# Patient Record
Sex: FEMALE | Race: WHITE
[De-identification: names, ages, dates, MRNs, and addresses within clinical notes are randomized per-mention and may not be internally consistent; named-entity substitution may affect disease eponyms.]

---

## 2018-07-02 ENCOUNTER — HOSPITAL ENCOUNTER (EMERGENCY)
Dept: HOSPITAL 26 - MED | Age: 83
Discharge: HOME | End: 2018-07-02
Payer: MEDICAID

## 2018-07-02 VITALS — SYSTOLIC BLOOD PRESSURE: 121 MMHG | DIASTOLIC BLOOD PRESSURE: 63 MMHG

## 2018-07-02 VITALS — HEIGHT: 57 IN | BODY MASS INDEX: 22.01 KG/M2 | WEIGHT: 102 LBS

## 2018-07-02 VITALS — DIASTOLIC BLOOD PRESSURE: 58 MMHG | SYSTOLIC BLOOD PRESSURE: 123 MMHG

## 2018-07-02 DIAGNOSIS — K56.41: ICD-10-CM

## 2018-07-02 DIAGNOSIS — F99: ICD-10-CM

## 2018-07-02 DIAGNOSIS — R19.7: ICD-10-CM

## 2018-07-02 DIAGNOSIS — E86.0: Primary | ICD-10-CM

## 2018-07-02 DIAGNOSIS — K59.8: ICD-10-CM

## 2018-07-02 LAB
ALBUMIN FLD-MCNC: 3.8 G/DL (ref 3.4–5)
AMYLASE SERPL-CCNC: 62 U/L (ref 25–115)
ANION GAP SERPL CALCULATED.3IONS-SCNC: 11 MMOL/L (ref 8–16)
APPEARANCE UR: CLEAR
AST SERPL-CCNC: 20 U/L (ref 15–37)
BASOPHILS # BLD AUTO: 0.1 K/UL (ref 0–0.22)
BASOPHILS NFR BLD AUTO: 1.8 % (ref 0–2)
BILIRUB SERPL-MCNC: 0.9 MG/DL (ref 0–1)
BILIRUB UR QL STRIP: (no result)
BUN SERPL-MCNC: 22 MG/DL (ref 7–18)
CHLORIDE SERPL-SCNC: 104 MMOL/L (ref 98–107)
CO2 SERPL-SCNC: 28.3 MMOL/L (ref 21–32)
COLOR UR: YELLOW
CREAT SERPL-MCNC: 0.9 MG/DL (ref 0.6–1.3)
EOSINOPHIL # BLD AUTO: 0.1 K/UL (ref 0–0.4)
EOSINOPHIL NFR BLD AUTO: 3.8 % (ref 0–4)
ERYTHROCYTE [DISTWIDTH] IN BLOOD BY AUTOMATED COUNT: 15.5 % (ref 11.6–13.7)
GFR SERPL CREATININE-BSD FRML MDRD: (no result) ML/MIN (ref 90–?)
GLUCOSE SERPL-MCNC: 92 MG/DL (ref 74–106)
GLUCOSE UR STRIP-MCNC: NEGATIVE MG/DL
HCT VFR BLD AUTO: 35.2 % (ref 36–48)
HGB BLD-MCNC: 11.7 G/DL (ref 12–16)
HGB UR QL STRIP: (no result)
LEUKOCYTE ESTERASE UR QL STRIP: NEGATIVE
LIPASE SERPL-CCNC: 62 U/L (ref 73–393)
LYMPHOCYTES # BLD AUTO: 1.5 K/UL (ref 2.5–16.5)
LYMPHOCYTES NFR BLD AUTO: 43 % (ref 20.5–51.1)
MCH RBC QN AUTO: 30 PG (ref 27–31)
MCHC RBC AUTO-ENTMCNC: 33 G/DL (ref 33–37)
MCV RBC AUTO: 88.8 FL (ref 80–94)
MONOCYTES # BLD AUTO: 0.2 K/UL (ref 0.8–1)
MONOCYTES NFR BLD AUTO: 5.8 % (ref 1.7–9.3)
NEUTROPHILS # BLD AUTO: 1.6 K/UL (ref 1.8–7.7)
NEUTROPHILS NFR BLD AUTO: 45.6 % (ref 42.2–75.2)
NITRITE UR QL STRIP: NEGATIVE
PH UR STRIP: 5.5 [PH] (ref 5–9)
PLATELET # BLD AUTO: 168 K/UL (ref 140–450)
POTASSIUM SERPL-SCNC: 4.3 MMOL/L (ref 3.5–5.1)
RBC # BLD AUTO: 3.96 MIL/UL (ref 4.2–5.4)
RBC #/AREA URNS HPF: (no result) /HPF (ref 0–5)
SODIUM SERPL-SCNC: 139 MMOL/L (ref 136–145)
WBC # BLD AUTO: 3.5 K/UL (ref 4.8–10.8)
WBC,URINE: (no result) /HPF (ref 0–5)

## 2018-07-02 PROCEDURE — 99285 EMERGENCY DEPT VISIT HI MDM: CPT

## 2018-07-02 PROCEDURE — 81001 URINALYSIS AUTO W/SCOPE: CPT

## 2018-07-02 PROCEDURE — 85025 COMPLETE CBC W/AUTO DIFF WBC: CPT

## 2018-07-02 PROCEDURE — 74176 CT ABD & PELVIS W/O CONTRAST: CPT

## 2018-07-02 PROCEDURE — 36415 COLL VENOUS BLD VENIPUNCTURE: CPT

## 2018-07-02 PROCEDURE — 76705 ECHO EXAM OF ABDOMEN: CPT

## 2018-07-02 PROCEDURE — 96361 HYDRATE IV INFUSION ADD-ON: CPT

## 2018-07-02 PROCEDURE — 96374 THER/PROPH/DIAG INJ IV PUSH: CPT

## 2018-07-02 PROCEDURE — 80053 COMPREHEN METABOLIC PANEL: CPT

## 2018-07-02 PROCEDURE — 82150 ASSAY OF AMYLASE: CPT

## 2018-07-02 PROCEDURE — 83690 ASSAY OF LIPASE: CPT

## 2018-07-02 NOTE — NUR
pt resting comfortably in [facility] at this time. Son and DTR at bedside 
at this time. safety precautions in place. vss. will continue to monitor.

## 2018-07-02 NOTE — NUR
81 yo f bib son with c/o intermittent diarrhea x 3 weeks. Referred by pcp, 
Leonid GR, today for blood work. Patient birdgette any abdomen pain or fevers. pt 
aaox4. gcs 15. cms intact. rr even and unlabored. lungs clear. abd soft, 
non-tender. er md virgen notified. pt needs met. safety prevautions in place. 
will continue to monitor.

## 2018-07-02 NOTE — NUR
pt resting in hospital [facility] at this time. vss. rr even and unlabored. family 
at bedside. safety precautions in place. will continue to monitor.

## 2018-07-02 NOTE — NUR
pt resting in [facility] at this time JUAN Ann at bedside attempting to 
insert IV. Safety precautions in place. Pt to receive IV meds at this time.

## 2018-07-02 NOTE — NUR
Patient discharged with v/s stable. Written and verbal after care instructions 
given and explained. 

Patient alert, oriented and verbalized understanding of instructions. Wheel 
Chair Assisted with by caregiver. All questions addressed prior to discharge. 
ID band removed. Patient advised to follow up with PMD. Rx of Lactulose given. 
Patient educated on indication of medication including possible reaction and 
side effects. Opportunity to ask questions provided and answered.

## 2018-09-26 ENCOUNTER — HOSPITAL ENCOUNTER (EMERGENCY)
Dept: HOSPITAL 26 - MED | Age: 83
Discharge: HOME | End: 2018-09-26
Payer: MEDICAID

## 2018-09-26 VITALS — BODY MASS INDEX: 22.01 KG/M2 | WEIGHT: 102 LBS | HEIGHT: 57 IN

## 2018-09-26 VITALS — SYSTOLIC BLOOD PRESSURE: 100 MMHG | DIASTOLIC BLOOD PRESSURE: 55 MMHG

## 2018-09-26 VITALS — SYSTOLIC BLOOD PRESSURE: 91 MMHG | DIASTOLIC BLOOD PRESSURE: 54 MMHG

## 2018-09-26 DIAGNOSIS — I48.91: ICD-10-CM
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
## 2018-09-26 DIAGNOSIS — S92.001A: Primary | ICD-10-CM

## 2018-09-26 DIAGNOSIS — Z86.73: ICD-10-CM
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
## 2018-09-26 LAB
ALBUMIN FLD-MCNC: 3.6 G/DL (ref 3.4–5)
ANION GAP SERPL CALCULATED.3IONS-SCNC: 9.5 MMOL/L (ref 8–16)
AST SERPL-CCNC: 14 U/L (ref 15–37)
BASOPHILS # BLD AUTO: 0 K/UL (ref 0–0.22)
BASOPHILS NFR BLD AUTO: 0.5 % (ref 0–2)
BILIRUB SERPL-MCNC: 0.6 MG/DL (ref 0–1)
BUN SERPL-MCNC: 18 MG/DL (ref 7–18)
CHLORIDE SERPL-SCNC: 105 MMOL/L (ref 98–107)
CO2 SERPL-SCNC: 28.7 MMOL/L (ref 21–32)
CREAT SERPL-MCNC: 0.8 MG/DL (ref 0.6–1.3)
EOSINOPHIL # BLD AUTO: 0 K/UL (ref 0–0.4)
EOSINOPHIL NFR BLD AUTO: 0.5 % (ref 0–4)
ERYTHROCYTE [DISTWIDTH] IN BLOOD BY AUTOMATED COUNT: 15.4 % (ref 11.6–13.7)
GFR SERPL CREATININE-BSD FRML MDRD: (no result) ML/MIN (ref 90–?)
GLUCOSE SERPL-MCNC: 87 MG/DL (ref 74–106)
HCT VFR BLD AUTO: 34.4 % (ref 36–48)
HGB BLD-MCNC: 11.2 G/DL (ref 12–16)
LYMPHOCYTES # BLD AUTO: 0.9 K/UL (ref 2.5–16.5)
LYMPHOCYTES NFR BLD AUTO: 17.5 % (ref 20.5–51.1)
MCH RBC QN AUTO: 30 PG (ref 27–31)
MCHC RBC AUTO-ENTMCNC: 33 G/DL (ref 33–37)
MCV RBC AUTO: 91.7 FL (ref 80–94)
MONOCYTES # BLD AUTO: 0.3 K/UL (ref 0.8–1)
MONOCYTES NFR BLD AUTO: 5.7 % (ref 1.7–9.3)
NEUTROPHILS # BLD AUTO: 3.8 K/UL (ref 1.8–7.7)
NEUTROPHILS NFR BLD AUTO: 75.8 % (ref 42.2–75.2)
PLATELET # BLD AUTO: 216 K/UL (ref 140–450)
POTASSIUM SERPL-SCNC: 4.2 MMOL/L (ref 3.5–5.1)
PROTHROMBIN TIME: 11.1 SECS (ref 10.8–13.4)
RBC # BLD AUTO: 3.75 MIL/UL (ref 4.2–5.4)
SODIUM SERPL-SCNC: 139 MMOL/L (ref 136–145)
WBC # BLD AUTO: 5 K/UL (ref 4.8–10.8)

## 2018-09-26 PROCEDURE — 84484 ASSAY OF TROPONIN QUANT: CPT

## 2018-09-26 PROCEDURE — 99285 EMERGENCY DEPT VISIT HI MDM: CPT

## 2018-09-26 PROCEDURE — 93005 ELECTROCARDIOGRAM TRACING: CPT

## 2018-09-26 PROCEDURE — 80053 COMPREHEN METABOLIC PANEL: CPT

## 2018-09-26 PROCEDURE — 70450 CT HEAD/BRAIN W/O DYE: CPT

## 2018-09-26 PROCEDURE — 85730 THROMBOPLASTIN TIME PARTIAL: CPT

## 2018-09-26 PROCEDURE — 85025 COMPLETE CBC W/AUTO DIFF WBC: CPT

## 2018-09-26 PROCEDURE — 73030 X-RAY EXAM OF SHOULDER: CPT

## 2018-09-26 PROCEDURE — 85610 PROTHROMBIN TIME: CPT

## 2018-09-26 PROCEDURE — 73562 X-RAY EXAM OF KNEE 3: CPT

## 2018-09-26 PROCEDURE — 71045 X-RAY EXAM CHEST 1 VIEW: CPT

## 2018-09-26 RX ADMIN — HYDROCODONE BITARTRATE AND ACETAMINOPHEN ONE TAB: 5; 325 TABLET ORAL at 15:39

## 2019-03-23 ENCOUNTER — HOSPITAL ENCOUNTER (INPATIENT)
Dept: HOSPITAL 26 - MED | Age: 84
LOS: 2 days | Discharge: LEFT BEFORE DISCHARGE | DRG: 562 | End: 2019-03-25
Attending: GENERAL PRACTICE | Admitting: GENERAL PRACTICE
Payer: COMMERCIAL

## 2019-03-23 VITALS — BODY MASS INDEX: 20.36 KG/M2 | HEIGHT: 58 IN | WEIGHT: 97 LBS

## 2019-03-23 VITALS — DIASTOLIC BLOOD PRESSURE: 54 MMHG | SYSTOLIC BLOOD PRESSURE: 110 MMHG

## 2019-03-23 DIAGNOSIS — Z79.01: ICD-10-CM

## 2019-03-23 DIAGNOSIS — J69.0: ICD-10-CM

## 2019-03-23 DIAGNOSIS — M81.0: ICD-10-CM

## 2019-03-23 DIAGNOSIS — I48.91: ICD-10-CM

## 2019-03-23 DIAGNOSIS — E05.90: ICD-10-CM

## 2019-03-23 DIAGNOSIS — Z53.21: ICD-10-CM

## 2019-03-23 DIAGNOSIS — Z96.653: ICD-10-CM

## 2019-03-23 DIAGNOSIS — R32: ICD-10-CM

## 2019-03-23 DIAGNOSIS — G25.81: ICD-10-CM

## 2019-03-23 DIAGNOSIS — E78.5: ICD-10-CM

## 2019-03-23 DIAGNOSIS — Z86.73: ICD-10-CM

## 2019-03-23 DIAGNOSIS — M24.411: Primary | ICD-10-CM

## 2019-03-23 DIAGNOSIS — D64.9: ICD-10-CM

## 2019-03-23 DIAGNOSIS — M19.90: ICD-10-CM

## 2019-03-23 NOTE — NUR
RECEIEVED REPORT FROM [facility] EMT. PT REPORTS PAIN 10/10 TO RIGHT ARM. REPORTS 
HEARING CRACK AFTER HANDING A THERMOS. PT SON AT BEDSIDE. AOX4 ABLE TO 
VERBALIZE NEEDS. LUNG SOUNDS CLEAR BILAT. BED IN LOWEST POSITION. WILL CONTINUE 
TO [place].

## 2019-03-24 VITALS — SYSTOLIC BLOOD PRESSURE: 114 MMHG | DIASTOLIC BLOOD PRESSURE: 48 MMHG

## 2019-03-24 VITALS — DIASTOLIC BLOOD PRESSURE: 58 MMHG | SYSTOLIC BLOOD PRESSURE: 100 MMHG

## 2019-03-24 VITALS — DIASTOLIC BLOOD PRESSURE: 66 MMHG | SYSTOLIC BLOOD PRESSURE: 150 MMHG

## 2019-03-24 LAB
ALBUMIN FLD-MCNC: 3.6 G/DL (ref 3.4–5)
AMYLASE SERPL-CCNC: 102 U/L (ref 25–115)
ANION GAP SERPL CALCULATED.3IONS-SCNC: 11.1 MMOL/L (ref 8–16)
ANION GAP SERPL CALCULATED.3IONS-SCNC: 13.3 MMOL/L (ref 8–16)
AST SERPL-CCNC: 21 U/L (ref 15–37)
BASOPHILS # BLD AUTO: 0 K/UL (ref 0–0.22)
BASOPHILS # BLD AUTO: 0 K/UL (ref 0–0.22)
BASOPHILS NFR BLD AUTO: 0.7 % (ref 0–2)
BASOPHILS NFR BLD AUTO: 0.8 % (ref 0–2)
BILIRUB SERPL-MCNC: 0.8 MG/DL (ref 0–1)
BUN SERPL-MCNC: 18 MG/DL (ref 7–18)
BUN SERPL-MCNC: 18 MG/DL (ref 7–18)
CHLORIDE SERPL-SCNC: 101 MMOL/L (ref 98–107)
CHLORIDE SERPL-SCNC: 103 MMOL/L (ref 98–107)
CHOLEST/HDLC SERPL: 1.4 {RATIO} (ref 1–4.5)
CO2 SERPL-SCNC: 27.3 MMOL/L (ref 21–32)
CO2 SERPL-SCNC: 28.4 MMOL/L (ref 21–32)
CREAT SERPL-MCNC: 0.7 MG/DL (ref 0.6–1.3)
CREAT SERPL-MCNC: 0.7 MG/DL (ref 0.6–1.3)
EOSINOPHIL # BLD AUTO: 0 K/UL (ref 0–0.4)
EOSINOPHIL # BLD AUTO: 0 K/UL (ref 0–0.4)
EOSINOPHIL NFR BLD AUTO: 0.6 % (ref 0–4)
EOSINOPHIL NFR BLD AUTO: 0.7 % (ref 0–4)
ERYTHROCYTE [DISTWIDTH] IN BLOOD BY AUTOMATED COUNT: 14.9 % (ref 11.6–13.7)
ERYTHROCYTE [DISTWIDTH] IN BLOOD BY AUTOMATED COUNT: 14.9 % (ref 11.6–13.7)
GFR SERPL CREATININE-BSD FRML MDRD: (no result) ML/MIN (ref 90–?)
GFR SERPL CREATININE-BSD FRML MDRD: (no result) ML/MIN (ref 90–?)
GLUCOSE SERPL-MCNC: 83 MG/DL (ref 74–106)
GLUCOSE SERPL-MCNC: 94 MG/DL (ref 74–106)
HCT VFR BLD AUTO: 33.9 % (ref 36–48)
HCT VFR BLD AUTO: 35.6 % (ref 36–48)
HDLC SERPL-MCNC: 88 MG/DL (ref 40–60)
HGB BLD-MCNC: 11.3 G/DL (ref 12–16)
HGB BLD-MCNC: 11.8 G/DL (ref 12–16)
LDLC SERPL CALC-MCNC: 28 MG/DL (ref 60–100)
LIPASE SERPL-CCNC: 129 U/L (ref 73–393)
LYMPHOCYTES # BLD AUTO: 1 K/UL (ref 2.5–16.5)
LYMPHOCYTES # BLD AUTO: 1.2 K/UL (ref 2.5–16.5)
LYMPHOCYTES NFR BLD AUTO: 30 % (ref 20.5–51.1)
LYMPHOCYTES NFR BLD AUTO: 33.2 % (ref 20.5–51.1)
MAGNESIUM SERPL-MCNC: 2.1 MG/DL (ref 1.8–2.4)
MCH RBC QN AUTO: 30 PG (ref 27–31)
MCH RBC QN AUTO: 31 PG (ref 27–31)
MCHC RBC AUTO-ENTMCNC: 33 G/DL (ref 33–37)
MCHC RBC AUTO-ENTMCNC: 34 G/DL (ref 33–37)
MCV RBC AUTO: 91.2 FL (ref 80–94)
MCV RBC AUTO: 91.2 FL (ref 80–94)
MONOCYTES # BLD AUTO: 0.2 K/UL (ref 0.8–1)
MONOCYTES # BLD AUTO: 0.3 K/UL (ref 0.8–1)
MONOCYTES NFR BLD AUTO: 6.6 % (ref 1.7–9.3)
MONOCYTES NFR BLD AUTO: 7.5 % (ref 1.7–9.3)
NEUTROPHILS # BLD AUTO: 2 K/UL (ref 1.8–7.7)
NEUTROPHILS # BLD AUTO: 2.1 K/UL (ref 1.8–7.7)
NEUTROPHILS NFR BLD AUTO: 58 % (ref 42.2–75.2)
NEUTROPHILS NFR BLD AUTO: 61.9 % (ref 42.2–75.2)
PHOSPHATE SERPL-MCNC: 3.8 MG/DL (ref 2.5–4.9)
PLATELET # BLD AUTO: 175 K/UL (ref 140–450)
PLATELET # BLD AUTO: 202 K/UL (ref 140–450)
POTASSIUM SERPL-SCNC: 4.5 MMOL/L (ref 3.5–5.1)
POTASSIUM SERPL-SCNC: 4.6 MMOL/L (ref 3.5–5.1)
PROTHROMBIN TIME: 15.7 SECS (ref 10.8–13.4)
RBC # BLD AUTO: 3.71 MIL/UL (ref 4.2–5.4)
RBC # BLD AUTO: 3.9 MIL/UL (ref 4.2–5.4)
SODIUM SERPL-SCNC: 137 MMOL/L (ref 136–145)
SODIUM SERPL-SCNC: 138 MMOL/L (ref 136–145)
TRIGL SERPL-MCNC: 38 MG/DL (ref 30–150)
TSH SERPL DL<=0.05 MIU/L-ACNC: 1.19 UIU/ML (ref 0.34–3.74)
WBC # BLD AUTO: 3.4 K/UL (ref 4.8–10.8)
WBC # BLD AUTO: 3.5 K/UL (ref 4.8–10.8)

## 2019-03-24 PROCEDURE — 0RSJXZZ REPOSITION RIGHT SHOULDER JOINT, EXTERNAL APPROACH: ICD-10-PCS

## 2019-03-24 RX ADMIN — Medication SCH EACH: at 08:56

## 2019-03-24 RX ADMIN — TAZOBACTAM SODIUM AND PIPERACILLIN SODIUM SCH MLS/HR: 375; 3 INJECTION, SOLUTION INTRAVENOUS at 05:17

## 2019-03-24 RX ADMIN — DEXTROSE AND SODIUM CHLORIDE SCH MLS/HR: 5; .45 INJECTION, SOLUTION INTRAVENOUS at 19:08

## 2019-03-24 RX ADMIN — OXYBUTYNIN CHLORIDE SCH MG: 5 TABLET ORAL at 09:03

## 2019-03-24 RX ADMIN — AMIODARONE HYDROCHLORIDE SCH MG: 200 TABLET ORAL at 09:03

## 2019-03-24 RX ADMIN — METHIMAZOLE SCH MG: 5 TABLET ORAL at 08:56

## 2019-03-24 RX ADMIN — DEXTROSE AND SODIUM CHLORIDE SCH MLS/HR: 5; .45 INJECTION, SOLUTION INTRAVENOUS at 20:03

## 2019-03-24 RX ADMIN — TAZOBACTAM SODIUM AND PIPERACILLIN SODIUM SCH MLS/HR: 375; 3 INJECTION, SOLUTION INTRAVENOUS at 13:00

## 2019-03-24 RX ADMIN — DEXTROSE AND SODIUM CHLORIDE SCH MLS/HR: 5; .45 INJECTION, SOLUTION INTRAVENOUS at 04:50

## 2019-03-24 RX ADMIN — ATORVASTATIN CALCIUM SCH MG: 20 TABLET, FILM COATED ORAL at 08:56

## 2019-03-24 RX ADMIN — TAZOBACTAM SODIUM AND PIPERACILLIN SODIUM SCH MLS/HR: 375; 3 INJECTION, SOLUTION INTRAVENOUS at 20:03

## 2019-03-24 NOTE — NUR
PATIENT LYING DOWN IN BED, NO DISTRESS NOTED. FLACC 8, MORPHINE GIVEN. SON AT BEDSIDE 
TALKING WITH PATIENT. SCHEDULED MEDICATIONS DUE GIVEN. WILL CONTINUE TO MONITOR.

## 2019-03-24 NOTE — NUR
USED BLUE PHONE TO COMMUNICATE WITH PT HOWEVER  WAS UNABLE TO UNDERSTAND PT- ID # 
[id number]. CALLED PT SON ZELDA AND WAS TOLD THAT PT WAS C/O RESTLESS LEG SYNDROME. SPOKE WITH 
DR. NY AND WAS ABLE TO CHANGE FREQUENCY OF REQUIP TO Q8H. MEDICATION WAS GIVEN AND PT 
TOLERATED WELL. NO S/S OF RESPIRATORY DISTRESS OR DISCOMFORT NOTED AT THIS TIME. WILL 
CONTINUE TO MONITOR.

## 2019-03-24 NOTE — NUR
PATIENT LYING DOWN IN BED, SON AT BEDSIDE. NO DISTRESS NOTED. CONDITION UNCHANGED. WILL 
CONTINUE TO MONITOR.

## 2019-03-24 NOTE — NUR
ADMITTED A 84 Y/O FEMALE FROM  WITH CHIEF COMPLAINT OF RIGHT SHOULDER PAIN VIA [facility]. V/S 
TAKEN WNL.RESPIRATION EVEN AND UNLABORED.TRANSFERRED PATIENT FROM [facility] TO BED WITH 2 
PEOPLE ASSIST WITH RIGHT SHOULDER SLING IN PLACE. SKIN INTACT. FALL PRECAUTION APPLIED. 
PERSONAL BELONGINGS AT BEDSIDE. CALL LIGHT WITHIN REACH. PATIENT ABLE TO RECOGNIZES HER 
NEEDS, AND  FOLLOW SIMPLE COMMANDS. BLANKET PROVIDED FOR COMFORT. ROUTINE ADMISSION CARE 
DONE AND CARRY OUT ORDERS. NO S/S OF DISTRESS. DENIES PAIN. ALL NEEDS ATTENDED. WILL 
CONTINUE TO MONITOR.

## 2019-03-24 NOTE — NUR
SEEN PATIENT RESTING ON BED. HOB ELEVATED. NO S/S OF DISTRESS NOTED. FALL PRECAUTION 
APPLIED. CALL LIGHT WITHIN REACH.

## 2019-03-24 NOTE — NUR
RECEIVED REPORT FROM DAY SHIFT NURSE LUL-RN AT BEDSIDE. PT AOX3- SPEAKS FARSEY-Indonesian, 
ON ROOM AIR WITH LEFT AC #20G. RIGHT SUBLUXATION ROTATOR CUFF IN SLING- MINIMIZE TURNING; 
USE BEDPAN. DISCUSSED PLAN OF CARE WITH SON ZELDA AND VERBALIZED UNDERSTANDING. NO S/S OF 
RESPIRATORY DISTRESS OR DISCOMFORT NOTED AT THIS TIME. BED IN LOWEST POSITION, BED BREAKS 
ON, BOTH SIDE RAILS UP AND BOTH FALL AND ASPIRATION PRECAUTIONS IN PLACE. BEDSIDE TABLE AND 
CALL LIGHT ARE WITHIN REACH. WILL CONTINUE TO MONITOR.

## 2019-03-24 NOTE — NUR
RECEIVED REPORT FROM NIGHT SHIFT NURSE. PATIENT LYING DOWN IN BED SLEEPING, AROUSABLE BY 
VOICE. NO DISTRESS NOTED. FLACC 0 RESPIRATIONS EVEN, UNLABORED, ON ROOM AIR. RESPIRATIONS 
EVEN, UNLABORED, ON ROOM AIR. RIGHT ARM SLING NOTED. AAOX3, CALM, COOPERATIVE, SPEAKS FARSI. 
SKIN COLOR APPROPRIATE TO ETHNICITY, WARM TO TOUCH. SKIN INTACT. REVIEWED PLAN OF CARE WITH 
PATIENT. REINFORCEMENT NEEDED. SAFETY MEASURES IN PLACE, CALL LIGHT WITHIN REACH. WILL 
CONTINUE TO MONITOR.

## 2019-03-24 NOTE — NUR
PATIENT LYING DOWN IN BED SLEEPING, AROUSABLE BY VOICE. NO DISTRESS NOTED. CONDITION 
UNCHANGED. SCHEDULED MEDICATIONS DUE GIVEN. WILL CONTINUE TO MONITOR.

## 2019-03-25 VITALS — SYSTOLIC BLOOD PRESSURE: 96 MMHG | DIASTOLIC BLOOD PRESSURE: 55 MMHG

## 2019-03-25 VITALS — DIASTOLIC BLOOD PRESSURE: 60 MMHG | SYSTOLIC BLOOD PRESSURE: 126 MMHG

## 2019-03-25 VITALS — SYSTOLIC BLOOD PRESSURE: 128 MMHG | DIASTOLIC BLOOD PRESSURE: 59 MMHG

## 2019-03-25 LAB
ANION GAP SERPL CALCULATED.3IONS-SCNC: 12.3 MMOL/L (ref 8–16)
APPEARANCE UR: CLEAR
BASOPHILS # BLD AUTO: 0 K/UL (ref 0–0.22)
BASOPHILS NFR BLD AUTO: 0.6 % (ref 0–2)
BILIRUB UR QL STRIP: NEGATIVE
BUN SERPL-MCNC: 14 MG/DL (ref 7–18)
CHLORIDE SERPL-SCNC: 103 MMOL/L (ref 98–107)
CO2 SERPL-SCNC: 26 MMOL/L (ref 21–32)
COLOR UR: YELLOW
CREAT SERPL-MCNC: 0.9 MG/DL (ref 0.6–1.3)
EOSINOPHIL # BLD AUTO: 0 K/UL (ref 0–0.4)
EOSINOPHIL NFR BLD AUTO: 0.5 % (ref 0–4)
ERYTHROCYTE [DISTWIDTH] IN BLOOD BY AUTOMATED COUNT: 14.6 % (ref 11.6–13.7)
GFR SERPL CREATININE-BSD FRML MDRD: (no result) ML/MIN (ref 90–?)
GLUCOSE SERPL-MCNC: 101 MG/DL (ref 74–106)
GLUCOSE UR STRIP-MCNC: NEGATIVE MG/DL
HCT VFR BLD AUTO: 34.8 % (ref 36–48)
HGB BLD-MCNC: 11.8 G/DL (ref 12–16)
HGB UR QL STRIP: (no result)
IRON SERPL-MCNC: 42 UG/DL (ref 35–150)
LEUKOCYTE ESTERASE UR QL STRIP: (no result)
LYMPHOCYTES # BLD AUTO: 1.1 K/UL (ref 2.5–16.5)
LYMPHOCYTES NFR BLD AUTO: 24.5 % (ref 20.5–51.1)
MCH RBC QN AUTO: 31 PG (ref 27–31)
MCHC RBC AUTO-ENTMCNC: 34 G/DL (ref 33–37)
MCV RBC AUTO: 91.2 FL (ref 80–94)
MONOCYTES # BLD AUTO: 0.2 K/UL (ref 0.8–1)
MONOCYTES NFR BLD AUTO: 5.1 % (ref 1.7–9.3)
NEUTROPHILS # BLD AUTO: 3 K/UL (ref 1.8–7.7)
NEUTROPHILS NFR BLD AUTO: 69.3 % (ref 42.2–75.2)
NITRITE UR QL STRIP: NEGATIVE
PH UR STRIP: 5.5 [PH] (ref 5–9)
PLATELET # BLD AUTO: 194 K/UL (ref 140–450)
POTASSIUM SERPL-SCNC: 4.3 MMOL/L (ref 3.5–5.1)
PROTHROMBIN TIME: 11.4 SECS (ref 10.8–13.4)
RBC # BLD AUTO: 3.82 MIL/UL (ref 4.2–5.4)
RBC #/AREA URNS HPF: (no result) /HPF (ref 0–5)
SODIUM SERPL-SCNC: 137 MMOL/L (ref 136–145)
T4 SERPL-MCNC: 7.5 UG/DL (ref 4.5–12)
TIBC SERPL-MCNC: 263 UG/DL (ref 250–450)
WBC # BLD AUTO: 4.3 K/UL (ref 4.8–10.8)
WBC,URINE: (no result) /HPF (ref 0–5)

## 2019-03-25 RX ADMIN — OXYBUTYNIN CHLORIDE SCH MG: 5 TABLET ORAL at 10:04

## 2019-03-25 RX ADMIN — Medication SCH EACH: at 10:04

## 2019-03-25 RX ADMIN — DEXTROSE AND SODIUM CHLORIDE SCH MLS/HR: 5; .45 INJECTION, SOLUTION INTRAVENOUS at 10:58

## 2019-03-25 RX ADMIN — TAZOBACTAM SODIUM AND PIPERACILLIN SODIUM SCH MLS/HR: 375; 3 INJECTION, SOLUTION INTRAVENOUS at 04:09

## 2019-03-25 RX ADMIN — AMIODARONE HYDROCHLORIDE SCH MG: 200 TABLET ORAL at 10:05

## 2019-03-25 RX ADMIN — TAZOBACTAM SODIUM AND PIPERACILLIN SODIUM SCH MLS/HR: 375; 3 INJECTION, SOLUTION INTRAVENOUS at 12:33

## 2019-03-25 RX ADMIN — ATORVASTATIN CALCIUM SCH MG: 20 TABLET, FILM COATED ORAL at 10:04

## 2019-03-25 RX ADMIN — METHIMAZOLE SCH MG: 5 TABLET ORAL at 10:04

## 2019-03-25 NOTE — NUR
SCHEDULED MEDS GIVEN,  PHONE USED,  #[id number]. PT VERBALIZED UNDERSTANDING 
AND FURTHER QUESTIONS. PT SWALLOWED PILL ONE BY ONE. NO DIFFICULTIES NOTED.

## 2019-03-25 NOTE — NUR
PT REFUSED SCD AT THIS TIME. EXPLAINED THE RISK AND BENEFITS, PT STILL REFUSING, PT'S SON AT 
BEDSIDE. PT'S SON WANTS TO DR GUZMAN. CALLED DR GUZMAN AND NOTIFIED HER.

## 2019-03-25 NOTE — NUR
RECEIVED PT REPORT FROM NIGHT SHIFT NURSE CLYDE-JUAN AT BEDSIDE. PT AAOX3, SPEAKS 
FARSI-Lithuanian, ON ROOM AIR WITH LEFT AC #20G, INFUSING WELL, ASYMPTOMATIC. RIGHT SUBLUXATION 
ROTATOR CUFF IN SLING- MINIMIZE TURNING. USES BEDPAN. NO S/S OF RESPIRATORY DISTRESS OR 
DISCOMFORT NOTED AT THIS TIME. FLACC 0. BED IN LOWEST POSITION, BED BREAKS ON, BOTH SIDE 
RAILS UP. FALL AND ASPIRATION PRECAUTIONS IN PLACE. BEDSIDE TABLE AND CALL LIGHT ARE WITHIN 
REACH. WILL CONTINUE TO MONITOR.

## 2019-03-25 NOTE — NUR
RECEIVED PATIENT ON ROOM AIR, PULSE OX SAT 95%. PATIENT DENIES SOB. NO RESPIRATORY DISTRESS 
NOTED AT THIS TIME. WILL CONTINUE TO MONITOR.

## 2019-03-25 NOTE — NUR
PT GETTING ANXIOUS AND TRYING TO PULL THE IV  OUT. IV SITE IS DRY AND INTACT, ASYMPTOMATIC. 
MADE DR GUZMAN AWARE, DR GUZMAN TALKED TO PT. PT C/O HUNGER. PROVIDED PT WITH PUDDING AND 
LUNCH TRAY. DISTRACTION MEASURE WORKS FINE AT THIS TIME. WILL TRY ATIVAN IF PT STILL PULLS 
IV CATH.

## 2019-03-25 NOTE — NUR
ASSISTED PT TO USE BEDPAN FOR URINE. SCHEDULED MEDICATION GIVEN AND TOLERATED WELL. NO S/S 
OF RESPIRATORY DISTRESS OR DISCOMFORT NOTED AT THIS TIME. WILL CONTINUE TO MONITOR.

## 2019-03-25 NOTE — NUR
PT'S SON CAME TO  PT. ACCORDING TO PT'S SON, PT HAS AN IMMIGRATION APPT TOMORROW 
MORNING. PT AND HER SON DECIDED TO LEAVE AMA. DR GUZMAN HAS ALREADY TALKED TO THE PT AND HER 
SON ABOUT RISK OF LEAVING AMA. THEY VERBALIZED UNDERSTANDING. PT'S SON SIGNED THE FORM. PT 
SON ALREADY MADE APPT WITH PT'S PCP FOR TOMORROW AFTERNOON. PER DR GUZMAN'S REQUEST, 
PROVIDED PT WITH ALL IMAGING REPORTS AND RX FOR PAIN. AND OTHER RX SENT TO [facility]. PT'S SON IS AWARE. IV DC'D, TIP INTACT, PRESSURE APPLIED. WRIST BANDS REMOVED. 
HELPED PT DRESSED. PT DID NOT HAVE SHIRT, PROVIDED ORANGE GOWN. PT WAS TRANSFERRED TO HER 
OWN WHEELCHAIR AND LEFT WITH HER SON WITH ALL HER BELONGINGS. 

-------------------------------------------------------------------------------

Addendum: 03/25/19 at 1857 by Sudarshan Jacob RN

-------------------------------------------------------------------------------

PT'S SHIRT WAS CUT UP. PT'S SON THREW IT AWAY IN THE TRASH BIN.

## 2019-03-25 NOTE — NUR
VITAL SIGNS TAKEN AND TOLERATED WELL. ASSISTED PT TO USE BEDPAN AND COLLECTED URINE SAMPLE. 
NO S/S OF RESPIRATORY DISTRESS OR DISCOMFORT NOTED AT THIS TIME. WILL CONTINUE TO MONITOR.

## 2019-03-25 NOTE — NUR
ASSISTED PT WITH PERINEAL CARE AFTER USING BEDPAN FOR URINE. PT TOLERATED WELL. NO S/S OF 
RESPIRATORY DISTRESS OR DISCOMFORT NOTED AT THIS TIME. WILL CONTINUE TO MONITOR.

## 2019-03-26 LAB
FERRITIN SERPL-MCNC: 108 NG/ML (ref 15–150)
FOLATE SERPL-MCNC: > 20 NG/ML (ref 3–?)
TRANSFERRIN SERPL-MCNC: 230 MG/DL (ref 200–370)
VIT B12 SERPL-MCNC: 332 PG/ML (ref 232–1245)

## 2022-09-05 ENCOUNTER — HOSPITAL ENCOUNTER (INPATIENT)
Dept: HOSPITAL 26 - MED | Age: 87
LOS: 5 days | Discharge: HOME | DRG: 871 | End: 2022-09-10
Attending: STUDENT IN AN ORGANIZED HEALTH CARE EDUCATION/TRAINING PROGRAM | Admitting: STUDENT IN AN ORGANIZED HEALTH CARE EDUCATION/TRAINING PROGRAM
Payer: COMMERCIAL

## 2022-09-05 VITALS — DIASTOLIC BLOOD PRESSURE: 21 MMHG | SYSTOLIC BLOOD PRESSURE: 91 MMHG

## 2022-09-05 VITALS — SYSTOLIC BLOOD PRESSURE: 138 MMHG | DIASTOLIC BLOOD PRESSURE: 75 MMHG

## 2022-09-05 VITALS — WEIGHT: 105 LBS | HEIGHT: 60 IN | BODY MASS INDEX: 20.62 KG/M2

## 2022-09-05 VITALS — SYSTOLIC BLOOD PRESSURE: 126 MMHG | DIASTOLIC BLOOD PRESSURE: 70 MMHG

## 2022-09-05 VITALS — SYSTOLIC BLOOD PRESSURE: 105 MMHG | DIASTOLIC BLOOD PRESSURE: 76 MMHG

## 2022-09-05 DIAGNOSIS — J96.01: ICD-10-CM

## 2022-09-05 DIAGNOSIS — E87.5: ICD-10-CM

## 2022-09-05 DIAGNOSIS — Z86.73: ICD-10-CM

## 2022-09-05 DIAGNOSIS — Z79.891: ICD-10-CM

## 2022-09-05 DIAGNOSIS — A41.9: Primary | ICD-10-CM

## 2022-09-05 DIAGNOSIS — U07.1: ICD-10-CM

## 2022-09-05 DIAGNOSIS — N17.0: ICD-10-CM

## 2022-09-05 DIAGNOSIS — Z79.01: ICD-10-CM

## 2022-09-05 DIAGNOSIS — J12.82: ICD-10-CM

## 2022-09-05 DIAGNOSIS — Z79.1: ICD-10-CM

## 2022-09-05 DIAGNOSIS — Z79.899: ICD-10-CM

## 2022-09-05 DIAGNOSIS — E78.5: ICD-10-CM

## 2022-09-05 DIAGNOSIS — E03.9: ICD-10-CM

## 2022-09-05 DIAGNOSIS — F03.90: ICD-10-CM

## 2022-09-05 DIAGNOSIS — E05.90: ICD-10-CM

## 2022-09-05 DIAGNOSIS — G93.41: ICD-10-CM

## 2022-09-05 DIAGNOSIS — I48.91: ICD-10-CM

## 2022-09-05 DIAGNOSIS — J11.1: ICD-10-CM

## 2022-09-05 LAB
ALBUMIN FLD-MCNC: 3.6 G/DL (ref 3.4–5)
ANION GAP SERPL CALCULATED.3IONS-SCNC: 14.2 MMOL/L (ref 8–16)
ANION GAP SERPL CALCULATED.3IONS-SCNC: 15.3 MMOL/L (ref 8–16)
ANION GAP SERPL CALCULATED.3IONS-SCNC: 16.2 MMOL/L (ref 8–16)
AST SERPL-CCNC: 62 U/L (ref 15–37)
BASOPHILS # BLD AUTO: 0 K/UL (ref 0–0.22)
BASOPHILS NFR BLD AUTO: 0.3 % (ref 0–2)
BILIRUB SERPL-MCNC: 0.4 MG/DL (ref 0–1)
BUN SERPL-MCNC: 19 MG/DL (ref 7–18)
BUN SERPL-MCNC: 21 MG/DL (ref 7–18)
BUN SERPL-MCNC: 22 MG/DL (ref 7–18)
CHLORIDE SERPL-SCNC: 105 MMOL/L (ref 98–107)
CHLORIDE SERPL-SCNC: 105 MMOL/L (ref 98–107)
CHLORIDE SERPL-SCNC: 107 MMOL/L (ref 98–107)
CO2 SERPL-SCNC: 24 MMOL/L (ref 21–32)
CO2 SERPL-SCNC: 26 MMOL/L (ref 21–32)
CO2 SERPL-SCNC: 27.4 MMOL/L (ref 21–32)
CREAT SERPL-MCNC: 0.6 MG/DL (ref 0.6–1.3)
CREAT SERPL-MCNC: 1 MG/DL (ref 0.6–1.3)
CREAT SERPL-MCNC: 1.1 MG/DL (ref 0.6–1.3)
EOSINOPHIL # BLD AUTO: 0 K/UL (ref 0–0.4)
EOSINOPHIL NFR BLD AUTO: 0 % (ref 0–4)
ERYTHROCYTE [DISTWIDTH] IN BLOOD BY AUTOMATED COUNT: 16.1 % (ref 11.6–13.7)
GFR SERPL CREATININE-BSD FRML MDRD: (no result) ML/MIN (ref 90–?)
GLUCOSE SERPL-MCNC: 101 MG/DL (ref 74–106)
GLUCOSE SERPL-MCNC: 120 MG/DL (ref 74–106)
GLUCOSE SERPL-MCNC: 124 MG/DL (ref 74–106)
HCT VFR BLD AUTO: 39.8 % (ref 36–48)
HGB BLD-MCNC: 13 G/DL (ref 12–16)
LYMPHOCYTES # BLD AUTO: 1.7 K/UL (ref 2.5–16.5)
LYMPHOCYTES NFR BLD AUTO: 41.3 % (ref 20.5–51.1)
MCH RBC QN AUTO: 28 PG (ref 27–31)
MCHC RBC AUTO-ENTMCNC: 33 G/DL (ref 33–37)
MCV RBC AUTO: 85.8 FL (ref 80–94)
MONOCYTES # BLD AUTO: 0.4 K/UL (ref 0.8–1)
MONOCYTES NFR BLD AUTO: 8.9 % (ref 1.7–9.3)
NEUTROPHILS # BLD AUTO: 2.1 K/UL (ref 1.8–7.7)
NEUTROPHILS NFR BLD AUTO: 49.5 % (ref 42.2–75.2)
PLATELET # BLD AUTO: 152 K/UL (ref 140–450)
POTASSIUM SERPL-SCNC: 4.2 MMOL/L (ref 3.5–5.1)
POTASSIUM SERPL-SCNC: 5.2 MMOL/L (ref 3.5–5.1)
POTASSIUM SERPL-SCNC: 5.7 MMOL/L (ref 3.5–5.1)
RBC # BLD AUTO: 4.64 MIL/UL (ref 4.2–5.4)
SODIUM SERPL-SCNC: 141 MMOL/L (ref 136–145)
SODIUM SERPL-SCNC: 142 MMOL/L (ref 136–145)
SODIUM SERPL-SCNC: 142 MMOL/L (ref 136–145)
WBC # BLD AUTO: 4.2 K/UL (ref 4.8–10.8)

## 2022-09-05 RX ADMIN — RIVAROXABAN SCH MG: 10 TABLET, FILM COATED ORAL at 09:00

## 2022-09-05 RX ADMIN — OSELTAMIVIR PHOSPHATE SCH MG: 75 CAPSULE ORAL at 20:33

## 2022-09-05 RX ADMIN — DEXAMETHASONE SODIUM PHOSPHATE SCH MG: 4 INJECTION, SOLUTION INTRAMUSCULAR; INTRAVENOUS at 13:32

## 2022-09-05 RX ADMIN — Medication SCH MG: at 13:36

## 2022-09-05 RX ADMIN — HYDROCODONE BITARTRATE AND ACETAMINOPHEN PRN TAB: 7.5; 325 TABLET ORAL at 17:06

## 2022-09-05 RX ADMIN — OXYCODONE HYDROCHLORIDE AND ACETAMINOPHEN SCH MG: 500 TABLET ORAL at 13:46

## 2022-09-05 RX ADMIN — RIVAROXABAN SCH MG: 10 TABLET, FILM COATED ORAL at 13:44

## 2022-09-05 RX ADMIN — HYDROCODONE BITARTRATE AND ACETAMINOPHEN PRN TAB: 7.5; 325 TABLET ORAL at 20:33

## 2022-09-05 RX ADMIN — Medication SCH MG: at 20:32

## 2022-09-05 RX ADMIN — OXYBUTYNIN CHLORIDE SCH MG: 5 TABLET ORAL at 13:34

## 2022-09-05 RX ADMIN — Medication SCH MG: at 13:38

## 2022-09-05 RX ADMIN — AMIODARONE HYDROCHLORIDE SCH MG: 200 TABLET ORAL at 13:33

## 2022-09-05 RX ADMIN — ATORVASTATIN CALCIUM SCH MG: 20 TABLET, FILM COATED ORAL at 13:34

## 2022-09-05 NOTE — NUR
Patient appears to be resting comfortably in bed- low fowlers with NC at 4L. 
Vital Signs stable at this moment. Respirations even and unlabored. no signs of 
distress noted. Safety measures are in place and attached to cardiac monitor.

## 2022-09-05 NOTE — NUR
PATIENT HAS BEEN SCREENED AND CATEGORIZED AS MODERATE NUTRITION RISK. PATIENT WILL BE SEEN 
WITHIN 3-5 DAYS OF ADMISSION.



9/5/22-9/9/22



YENNY NICOLE RD

## 2022-09-05 NOTE — NUR
pt has low BP. ER MD aware. pt currently not experiencing any hypotensive sx. 
pt aaox4 and responding to questions.

## 2022-09-05 NOTE — NUR
Patient will be admitted to care of Siena GR. Admitted to [facility].  Will go 
to room 118. Belongings list completed.  Report to Fadi MARTINEZ.

## 2022-09-05 NOTE — NUR
Patient noted to have existing wounds upon arrival to ER.  Photos taken of 
wound and placed in chart. Physician informed.

## 2022-09-06 VITALS — SYSTOLIC BLOOD PRESSURE: 112 MMHG | DIASTOLIC BLOOD PRESSURE: 67 MMHG

## 2022-09-06 VITALS — DIASTOLIC BLOOD PRESSURE: 61 MMHG | SYSTOLIC BLOOD PRESSURE: 110 MMHG

## 2022-09-06 VITALS — DIASTOLIC BLOOD PRESSURE: 64 MMHG | SYSTOLIC BLOOD PRESSURE: 113 MMHG

## 2022-09-06 VITALS — SYSTOLIC BLOOD PRESSURE: 99 MMHG | DIASTOLIC BLOOD PRESSURE: 75 MMHG

## 2022-09-06 VITALS — SYSTOLIC BLOOD PRESSURE: 98 MMHG | DIASTOLIC BLOOD PRESSURE: 70 MMHG

## 2022-09-06 VITALS — SYSTOLIC BLOOD PRESSURE: 113 MMHG | DIASTOLIC BLOOD PRESSURE: 67 MMHG

## 2022-09-06 LAB
ALBUMIN FLD-MCNC: 3.1 G/DL (ref 3.4–5)
ANION GAP SERPL CALCULATED.3IONS-SCNC: 11.5 MMOL/L (ref 8–16)
AST SERPL-CCNC: 43 U/L (ref 15–37)
BILIRUB SERPL-MCNC: 0.6 MG/DL (ref 0–1)
BUN SERPL-MCNC: 21 MG/DL (ref 7–18)
CHLORIDE SERPL-SCNC: 106 MMOL/L (ref 98–107)
CO2 SERPL-SCNC: 26.3 MMOL/L (ref 21–32)
CREAT SERPL-MCNC: 0.7 MG/DL (ref 0.6–1.3)
GFR SERPL CREATININE-BSD FRML MDRD: (no result) ML/MIN (ref 90–?)
GLUCOSE SERPL-MCNC: 95 MG/DL (ref 74–106)
POTASSIUM SERPL-SCNC: 3.8 MMOL/L (ref 3.5–5.1)
SODIUM SERPL-SCNC: 140 MMOL/L (ref 136–145)

## 2022-09-06 RX ADMIN — ATORVASTATIN CALCIUM SCH MG: 20 TABLET, FILM COATED ORAL at 10:05

## 2022-09-06 RX ADMIN — AMIODARONE HYDROCHLORIDE SCH MG: 200 TABLET ORAL at 10:04

## 2022-09-06 RX ADMIN — OXYCODONE HYDROCHLORIDE AND ACETAMINOPHEN SCH MG: 500 TABLET ORAL at 10:06

## 2022-09-06 RX ADMIN — OXYBUTYNIN CHLORIDE SCH MG: 5 TABLET ORAL at 10:04

## 2022-09-06 RX ADMIN — Medication SCH MG: at 10:08

## 2022-09-06 RX ADMIN — Medication SCH MG: at 10:05

## 2022-09-06 RX ADMIN — OSELTAMIVIR PHOSPHATE SCH MG: 75 CAPSULE ORAL at 22:59

## 2022-09-06 RX ADMIN — OSELTAMIVIR PHOSPHATE SCH MG: 75 CAPSULE ORAL at 10:05

## 2022-09-06 RX ADMIN — Medication SCH MG: at 21:00

## 2022-09-06 RX ADMIN — DEXAMETHASONE SODIUM PHOSPHATE SCH MG: 4 INJECTION, SOLUTION INTRAMUSCULAR; INTRAVENOUS at 10:04

## 2022-09-06 RX ADMIN — DEXTROSE SCH MLS/HR: 50 INJECTION, SOLUTION INTRAVENOUS at 10:04

## 2022-09-06 NOTE — NUR
RECEIVED PATIENT FROM NIGHT SHIFT NURSE FOR CONTINUITY OF CARE. PT IS AOX3. RESPIRATIONS 
EVEN AND UNLABORED. ON 2L NC AND O2 SATURATION AT 98%. NO DISTRESS NOTED. SKIN IS WARM, DRY, 
INTACT. IV SITE ON RAC AND LFA 20G. SALINE LOCKED. INTACT AND PATENT. ABD IS SOFT, FLAT, 
NONDISTENDED. BOWEL SOUNDS ACTIVE IN ALL QUADRANTS. PT DENIES PAIN AT THE MOMENT. PLAN OF 
CARE DISCUSSED. SAFETY PRECAUTIONS IN PLACE. CALL LIGHT WITHIN REACH. BED IN LOW POSITION. 
WILL CONTINUE TO MONITOR.

## 2022-09-07 VITALS — SYSTOLIC BLOOD PRESSURE: 116 MMHG | DIASTOLIC BLOOD PRESSURE: 68 MMHG

## 2022-09-07 VITALS — DIASTOLIC BLOOD PRESSURE: 75 MMHG | SYSTOLIC BLOOD PRESSURE: 119 MMHG

## 2022-09-07 VITALS — SYSTOLIC BLOOD PRESSURE: 146 MMHG | DIASTOLIC BLOOD PRESSURE: 67 MMHG

## 2022-09-07 VITALS — SYSTOLIC BLOOD PRESSURE: 119 MMHG | DIASTOLIC BLOOD PRESSURE: 68 MMHG

## 2022-09-07 VITALS — SYSTOLIC BLOOD PRESSURE: 137 MMHG | DIASTOLIC BLOOD PRESSURE: 68 MMHG

## 2022-09-07 VITALS — DIASTOLIC BLOOD PRESSURE: 60 MMHG | SYSTOLIC BLOOD PRESSURE: 112 MMHG

## 2022-09-07 LAB
ALBUMIN FLD-MCNC: 2.8 G/DL (ref 3.4–5)
ANION GAP SERPL CALCULATED.3IONS-SCNC: 12.1 MMOL/L (ref 8–16)
ANION GAP SERPL CALCULATED.3IONS-SCNC: 12.9 MMOL/L (ref 8–16)
AST SERPL-CCNC: 43 U/L (ref 15–37)
BILIRUB SERPL-MCNC: 0.4 MG/DL (ref 0–1)
BUN SERPL-MCNC: 27 MG/DL (ref 7–18)
BUN SERPL-MCNC: 27 MG/DL (ref 7–18)
CHLORIDE SERPL-SCNC: 107 MMOL/L (ref 98–107)
CHLORIDE SERPL-SCNC: 107 MMOL/L (ref 98–107)
CO2 SERPL-SCNC: 26.2 MMOL/L (ref 21–32)
CO2 SERPL-SCNC: 27 MMOL/L (ref 21–32)
CREAT SERPL-MCNC: 0.7 MG/DL (ref 0.6–1.3)
CREAT SERPL-MCNC: 0.7 MG/DL (ref 0.6–1.3)
GFR SERPL CREATININE-BSD FRML MDRD: (no result) ML/MIN (ref 90–?)
GFR SERPL CREATININE-BSD FRML MDRD: (no result) ML/MIN (ref 90–?)
GLUCOSE SERPL-MCNC: 106 MG/DL (ref 74–106)
GLUCOSE SERPL-MCNC: 107 MG/DL (ref 74–106)
POTASSIUM SERPL-SCNC: 4.1 MMOL/L (ref 3.5–5.1)
POTASSIUM SERPL-SCNC: 4.1 MMOL/L (ref 3.5–5.1)
SODIUM SERPL-SCNC: 142 MMOL/L (ref 136–145)
SODIUM SERPL-SCNC: 142 MMOL/L (ref 136–145)
T4 FREE SERPL-MCNC: 0.99 NG/DL (ref 0.76–1.46)
TSH SERPL DL<=0.05 MIU/L-ACNC: 0.3 UIU/ML (ref 0.34–3.74)

## 2022-09-07 PROCEDURE — XW033E5 INTRODUCTION OF REMDESIVIR ANTI-INFECTIVE INTO PERIPHERAL VEIN, PERCUTANEOUS APPROACH, NEW TECHNOLOGY GROUP 5: ICD-10-PCS

## 2022-09-07 RX ADMIN — SODIUM CHLORIDE SCH MLS/HR: 9 INJECTION, SOLUTION INTRAVENOUS at 12:19

## 2022-09-07 RX ADMIN — Medication SCH MG: at 10:01

## 2022-09-07 RX ADMIN — OSELTAMIVIR PHOSPHATE SCH MG: 75 CAPSULE ORAL at 22:36

## 2022-09-07 RX ADMIN — Medication SCH MG: at 22:36

## 2022-09-07 RX ADMIN — OXYBUTYNIN CHLORIDE SCH MG: 5 TABLET ORAL at 10:01

## 2022-09-07 RX ADMIN — OXYCODONE HYDROCHLORIDE AND ACETAMINOPHEN SCH MG: 500 TABLET ORAL at 10:01

## 2022-09-07 RX ADMIN — AMIODARONE HYDROCHLORIDE SCH MG: 200 TABLET ORAL at 10:01

## 2022-09-07 RX ADMIN — DEXAMETHASONE SODIUM PHOSPHATE SCH MG: 4 INJECTION, SOLUTION INTRAMUSCULAR; INTRAVENOUS at 10:01

## 2022-09-07 RX ADMIN — OSELTAMIVIR PHOSPHATE SCH MG: 75 CAPSULE ORAL at 10:01

## 2022-09-07 RX ADMIN — DEXTROSE SCH MLS/HR: 50 INJECTION, SOLUTION INTRAVENOUS at 10:01

## 2022-09-07 RX ADMIN — RIVAROXABAN SCH MG: 15 TABLET, FILM COATED ORAL at 10:01

## 2022-09-07 NOTE — NUR
SCREEN FOR LOW HALIMA SCALE AT RISK, CONTINUE TO FOLLOW PRESSURE ULCER PREVENTION 
INTERVENTIONS.

-TURN AND REPOSITION PATIENT Q 2H, ASSIST IF NEEDED 

-ASSESS AND MONITOR SKIN CONDITION DURING POSITION CHANGES

-OFFLOAD BILATERAL HEELS BY PLACING PILLOWS UNDER CALVES AT ALL TIMES, UNLESS OTHERWISE 
CONTRAINDICATED

-PRESSURE REDISTRIBUTION BY PLACING PILLOWS AND OFFLOADING SACRALCOCCYX

-KEEP SKIN CLEAN AND DRY AT ALL TIMES.

## 2022-09-07 NOTE — NUR
DC PLANNING 



PATIENT POSITIVE FOR INFLUENZA A & B AND IS COVID-19 POSITIVE THEREFORE SW 

OUTREACHED TO PATIENTS EMERGENCY CONTACT, FELICITY ARREDONDO (SON) TO GATHER 

COLLATERAL INFORMATION. MR. ARREDONDO REPORTS PATIENT RESIDES WITH FAMILY AT 

THE ADDRESS LISTED ON FILE. IDENTIFIES HIMSELF AND KRUNAL BHAKTA 

(COUSIN) [phone] AS EMERGENCY CONTACT. MR ARREDONDO REPORTS PT VISITS 

WITH PCP REGULARLY; LAST VISIT JUNE 22'. PT IS REPORTED TO BE MEDICATION 

COMPLIANT, FAMILY DENIES BARRIERS IN ACCESSING NEEDED MEDICATION. FAMILY 

RECEIVES MEDICATION FROM [facility] ON [facility] IN [place], WHEN NEEDED. FAMILY 

REPORTS ADEQUATE FOOD SOURCE.  PATIENT REQUIRES ASSISTANCE WITH ADL'S AND 

IS REPORTED TO BE TOTAL CARE. PATIENT UTILIZES DME;WC, BEDSIDE COMMODE & 

SHOWER CHAIR.MR. ARREDONDO REPORTS HE IS PATIENTS FULL TIME CAREGIVER. 

FAMILY DENIES HX OF SNF PLACEMENT AND DENIES HX OF HOME HEALTH SERVICES. 

FAMILY DENIES MENTAL HLTH HX. MR. ARREDONDO REPORTS DC PLAN IS FOR PATIENT 

TO RETURN HOME WHEN CLINICALLY STABLE AND AID IN REQUIRED CARE. SW 

INQUIRED ON ADDITIONAL RESOURCES NEEDED, FAMILY DECLINED AT THIS TIME.

## 2022-09-07 NOTE — NUR
RECEIVED REPORT FROM NIGHTSHIFT NURSE SIRI FOR CONTINUITY OF CARE. PT IS CURRENTLY AWAKE, 
SPEAKS ONLY FARSI AND IS A/OX2 TO SELF AND PLACE. BREATHING IS EVEN, REGULAR AND UNLABORED 
ON 2L VIA NC. PT SKIN IS INTACT, AND IS INCONTINENT OF THE BOWEL AND BLADDER. IV SITE IS 
CLEAN, DRY, AND PATENT SALINE LOCK. NO SIGNS OF PAIN OR DISTRESS NOTED AT THIS TIME. DROPLET 
AND AIRBORNE ISOLATION PRECAUTIONS STILL IN EFFECT FOR COVID AND INFLUENZA A.

## 2022-09-07 NOTE — NUR
RECEIVED REPORT FROM DAY SHIFT NURSE FOR CONTINUITY OF CARE. PATIENT IS A&O X 1-2, SPEAKS 
FARSY. PATIENT IS ON ROOM AIR, BREATHING IS NORMAL WITH SYMMETRICAL RISE AND FALL OF CHEST. 
IV IS 22G RFA, RUNNING 5ML TKO. BED IS IN LOWEST POSITION, WHEELS LOCKED, CALL LIGHT IN 
PLACE. WILL CONTINUE TO OBSERVE PATIENT.

## 2022-09-07 NOTE — NUR
GAVE MEDICATIONS TO PATIENT. ATTEMPTED USING  SERVICE FOR FARSI LANGUAGE (NAME: 
EDWARD, ID# [id number]).  WAS UNABLE TO UNDERSTAND PATIENT AND PATIENT DID NOT SEEM TO 
ACKNOWLEDGE . PATIENT HAD VOIDED AND WAS CHANGED BY CNA AND MYSELF. BREATHING WAS 
NORMAL WITH SYMMETRICAL RISE AND FALL OF CHEST. WILL CONTINUE TO OBSERVE PATIENT. Pt admitted for an intracranial hemorrhage

## 2022-09-07 NOTE — NUR
IV DRESSING APPEARED DAMP, AND INFILTRATION NOTED ON POSTERIOR SIDE OF EXTREMITY. IV REMOVED 
AND DRESSED. ATTEMPTED IV 2X WITHOUT SUCCESS. CHARGE RN GUILLERMO WILL ATTEMPT AT LATER TIME.

## 2022-09-08 VITALS — DIASTOLIC BLOOD PRESSURE: 46 MMHG | SYSTOLIC BLOOD PRESSURE: 94 MMHG

## 2022-09-08 VITALS — SYSTOLIC BLOOD PRESSURE: 122 MMHG | DIASTOLIC BLOOD PRESSURE: 66 MMHG

## 2022-09-08 VITALS — DIASTOLIC BLOOD PRESSURE: 68 MMHG | SYSTOLIC BLOOD PRESSURE: 116 MMHG

## 2022-09-08 LAB
ALBUMIN FLD-MCNC: 3 G/DL (ref 3.4–5)
ANION GAP SERPL CALCULATED.3IONS-SCNC: 11.3 MMOL/L (ref 8–16)
ANION GAP SERPL CALCULATED.3IONS-SCNC: 13.2 MMOL/L (ref 8–16)
AST SERPL-CCNC: 32 U/L (ref 15–37)
BILIRUB SERPL-MCNC: 0.5 MG/DL (ref 0–1)
BUN SERPL-MCNC: 22 MG/DL (ref 7–18)
BUN SERPL-MCNC: 24 MG/DL (ref 7–18)
CHLORIDE SERPL-SCNC: 106 MMOL/L (ref 98–107)
CHLORIDE SERPL-SCNC: 106 MMOL/L (ref 98–107)
CO2 SERPL-SCNC: 28.4 MMOL/L (ref 21–32)
CO2 SERPL-SCNC: 29.3 MMOL/L (ref 21–32)
CREAT SERPL-MCNC: 0.6 MG/DL (ref 0.6–1.3)
CREAT SERPL-MCNC: 0.7 MG/DL (ref 0.6–1.3)
GFR SERPL CREATININE-BSD FRML MDRD: (no result) ML/MIN (ref 90–?)
GFR SERPL CREATININE-BSD FRML MDRD: (no result) ML/MIN (ref 90–?)
GLUCOSE SERPL-MCNC: 110 MG/DL (ref 74–106)
GLUCOSE SERPL-MCNC: 111 MG/DL (ref 74–106)
POTASSIUM SERPL-SCNC: 4.6 MMOL/L (ref 3.5–5.1)
POTASSIUM SERPL-SCNC: 4.6 MMOL/L (ref 3.5–5.1)
SODIUM SERPL-SCNC: 142 MMOL/L (ref 136–145)
SODIUM SERPL-SCNC: 143 MMOL/L (ref 136–145)

## 2022-09-08 RX ADMIN — Medication SCH MG: at 08:41

## 2022-09-08 RX ADMIN — DEXAMETHASONE SODIUM PHOSPHATE SCH MG: 4 INJECTION, SOLUTION INTRAMUSCULAR; INTRAVENOUS at 08:40

## 2022-09-08 RX ADMIN — OXYBUTYNIN CHLORIDE SCH MG: 5 TABLET ORAL at 08:41

## 2022-09-08 RX ADMIN — OSELTAMIVIR PHOSPHATE SCH MG: 75 CAPSULE ORAL at 08:41

## 2022-09-08 RX ADMIN — OXYCODONE HYDROCHLORIDE AND ACETAMINOPHEN SCH MG: 500 TABLET ORAL at 08:42

## 2022-09-08 RX ADMIN — AMIODARONE HYDROCHLORIDE SCH MG: 200 TABLET ORAL at 08:41

## 2022-09-08 RX ADMIN — SODIUM CHLORIDE SCH MLS/HR: 9 INJECTION, SOLUTION INTRAVENOUS at 12:02

## 2022-09-08 RX ADMIN — Medication SCH MG: at 21:00

## 2022-09-08 RX ADMIN — OSELTAMIVIR PHOSPHATE SCH MG: 75 CAPSULE ORAL at 21:24

## 2022-09-08 RX ADMIN — RIVAROXABAN SCH MG: 15 TABLET, FILM COATED ORAL at 08:43

## 2022-09-08 RX ADMIN — DEXTROSE SCH MLS/HR: 50 INJECTION, SOLUTION INTRAVENOUS at 08:39

## 2022-09-08 RX ADMIN — Medication SCH MG: at 08:43

## 2022-09-08 NOTE — NUR
9/8/22 RD INITIAL ASSESSMENT COMPLETED



PLEASE REFER TO NUTRITION ASSESSMENT UNDER CARE ACTIVITY FOR ESTIMATED NUTRITIONAL NEEDS. 



1. RECOMMEND PUREE CCHO 60GM DIET AS TOLERATED

-RD RECOMMENDS GLUCERNA BID FOR NUTRITION SUPPORT. 

2. MONITOR NUTRITION-RELATED LAB VALUES AND PO INTAKE

3. RD TO FOLLOW-UP 3-5 DAYS, MODERATE RISK 



REVIEWED BY DENIS STOKES RD

## 2022-09-08 NOTE — NUR
LOOKED IN ON PATIENT PATIENT WAS SLEEPING. BREATHING WAS NORMAL WITH SYMMETRICAL RISE AND 
FALL OF CHEST. IVF WAS RUNNING 5ML TKO. WILL CONTINUE TO OBSERVE PATIENT.

## 2022-09-08 NOTE — NUR
PATIENT IN BED ASLEEP WITHOUT INCIDENT. NO NOTED S/S OF PAIN. NO NOTED S/S OF RESPIRATORY 
DISTRESS. CALL LIGHT WITHIN REACH FOR MENTAL STIMULATION FROM TV. PATIENT CHEST NOTED RISING 
AND FALLING WITHOUT INCIDENT. NURSINGG WILL FREQUENT THIS ROOM FOR ANTICIPATED NEEDS. 
[email or web address]

## 2022-09-08 NOTE — NUR
LOOKED IN ON PATIENT. PATIENT OPENED HER EYES AND STARRED AT ME BUT DID NOT SPEAK. PATIENT'S 
BREATHING WAS NORMAL WITH SYMMETRICAL RISE AND FALL OF CHEST. WILL CONTINUE TO OBSERVE.

## 2022-09-08 NOTE — NUR
NURSING NOTED DURING ROUNDS IN BED AWAKE. NO NOTED S/S OF PAIN/DISCOMFORT. NURSING PUT BACK 
ON NASAL CANNULA AT 2 LITERS PER RT VERIFICATION IF SHE SHOULD BE ON ROOM AIR. NO NOTED S/S 
OF RESPIRATORY DISTRESS. CALL LIGHT WITHIN REACH BUT FOR MAENTAL STIMULATION TO TV. NURSING 
WILL FREQUENT THIS ROOM FOR ANTICIPATED NEEDS. [email or web address]

## 2022-09-09 VITALS — SYSTOLIC BLOOD PRESSURE: 111 MMHG | DIASTOLIC BLOOD PRESSURE: 79 MMHG

## 2022-09-09 VITALS — DIASTOLIC BLOOD PRESSURE: 50 MMHG | SYSTOLIC BLOOD PRESSURE: 101 MMHG

## 2022-09-09 VITALS — SYSTOLIC BLOOD PRESSURE: 104 MMHG | DIASTOLIC BLOOD PRESSURE: 56 MMHG

## 2022-09-09 LAB
ALBUMIN FLD-MCNC: 3 G/DL (ref 3.4–5)
ANION GAP SERPL CALCULATED.3IONS-SCNC: 11.8 MMOL/L (ref 8–16)
AST SERPL-CCNC: 26 U/L (ref 15–37)
BASOPHILS # BLD AUTO: 0 K/UL (ref 0–0.22)
BASOPHILS NFR BLD AUTO: 0.1 % (ref 0–2)
BILIRUB DIRECT SERPL-MCNC: 0.1 MG/DL (ref 0–0.3)
BILIRUB SERPL-MCNC: 0.4 MG/DL (ref 0–1)
BUN SERPL-MCNC: 23 MG/DL (ref 7–18)
CHLORIDE SERPL-SCNC: 102 MMOL/L (ref 98–107)
CO2 SERPL-SCNC: 25.2 MMOL/L (ref 21–32)
CREAT SERPL-MCNC: 0.6 MG/DL (ref 0.6–1.3)
EOSINOPHIL # BLD AUTO: 0 K/UL (ref 0–0.4)
EOSINOPHIL NFR BLD AUTO: 0 % (ref 0–4)
ERYTHROCYTE [DISTWIDTH] IN BLOOD BY AUTOMATED COUNT: 15.1 % (ref 11.6–13.7)
GFR SERPL CREATININE-BSD FRML MDRD: (no result) ML/MIN (ref 90–?)
GLUCOSE SERPL-MCNC: 128 MG/DL (ref 74–106)
HCT VFR BLD AUTO: 40.2 % (ref 36–48)
HGB BLD-MCNC: 13.3 G/DL (ref 12–16)
LYMPHOCYTES # BLD AUTO: 1.6 K/UL (ref 2.5–16.5)
LYMPHOCYTES NFR BLD AUTO: 21.7 % (ref 20.5–51.1)
MCH RBC QN AUTO: 28 PG (ref 27–31)
MCHC RBC AUTO-ENTMCNC: 33 G/DL (ref 33–37)
MCV RBC AUTO: 84.6 FL (ref 80–94)
MONOCYTES # BLD AUTO: 0.4 K/UL (ref 0.8–1)
MONOCYTES NFR BLD AUTO: 5.8 % (ref 1.7–9.3)
NEUTROPHILS # BLD AUTO: 5.5 K/UL (ref 1.8–7.7)
NEUTROPHILS NFR BLD AUTO: 72.4 % (ref 42.2–75.2)
PLATELET # BLD AUTO: 171 K/UL (ref 140–450)
POTASSIUM SERPL-SCNC: 4 MMOL/L (ref 3.5–5.1)
RBC # BLD AUTO: 4.75 MIL/UL (ref 4.2–5.4)
SODIUM SERPL-SCNC: 135 MMOL/L (ref 136–145)
WBC # BLD AUTO: 7.6 K/UL (ref 4.8–10.8)

## 2022-09-09 RX ADMIN — Medication SCH MG: at 21:00

## 2022-09-09 RX ADMIN — Medication SCH MG: at 08:38

## 2022-09-09 RX ADMIN — AMIODARONE HYDROCHLORIDE SCH MG: 200 TABLET ORAL at 08:35

## 2022-09-09 RX ADMIN — Medication SCH MG: at 08:36

## 2022-09-09 RX ADMIN — DEXAMETHASONE SODIUM PHOSPHATE SCH MG: 4 INJECTION, SOLUTION INTRAMUSCULAR; INTRAVENOUS at 08:34

## 2022-09-09 RX ADMIN — OSELTAMIVIR PHOSPHATE SCH MG: 75 CAPSULE ORAL at 21:00

## 2022-09-09 RX ADMIN — OXYCODONE HYDROCHLORIDE AND ACETAMINOPHEN SCH MG: 500 TABLET ORAL at 08:38

## 2022-09-09 RX ADMIN — RIVAROXABAN SCH MG: 15 TABLET, FILM COATED ORAL at 08:40

## 2022-09-09 RX ADMIN — OSELTAMIVIR PHOSPHATE SCH MG: 75 CAPSULE ORAL at 08:37

## 2022-09-09 RX ADMIN — DEXTROSE SCH MLS/HR: 50 INJECTION, SOLUTION INTRAVENOUS at 08:31

## 2022-09-09 RX ADMIN — SODIUM CHLORIDE SCH MLS/HR: 9 INJECTION, SOLUTION INTRAVENOUS at 12:00

## 2022-09-09 RX ADMIN — OXYBUTYNIN CHLORIDE SCH MG: 5 TABLET ORAL at 08:35

## 2022-09-09 NOTE — NUR
JEY CALLED BACK AND INFORMED HE WOULD LIKE TO HIS MOTHER TO GET DISCHARGED TOMORROW BECAUSE 
OF THE RAIN. HE ALSO WANTS TO HAVE TRANSPORTATION SET UP TOMORROW. LEFT MESSAGE FOR SOCIAL 
WORKER KEN LITTLE TO FOLLOW UP WITH THE FAMILY REGARDING POSSIBLE TRANSPORTATION. [email or web address]

## 2022-09-09 NOTE — NUR
ENDORSED PATIENT CARE TO JUSTO RN FOR CONTINUITY OF CARE. PATIENT WAS STABLE AT THE CHANGE OF 
SHIFT. [email or web address]

## 2022-09-09 NOTE — NUR
PATIENT IS EASE TO AROUSE WHEN NAME IS CALLED. NO NOTED S/S OF PAIN. NO NOTED S/S OF 
RESPIRATORY DISTRESS. PATIENT REMAINS CLEAN AND DRY. SIDE RAILS UP X 3 FOR SAFETY AND 
COMFORT. BED AT LOWEST LEVEL. SEMI PEREZ POSITION. CALL LIGHT WITHIN REACH FOR MENTAL 
STIMULATION FOR TV. NURSING WILL FREQUENT THIS ROOM FOR SAFETY AND ANTICIPATED NEEDS. 
[email or web address]

## 2022-09-09 NOTE — NUR
[facility] DELIVERED THE OXYGEN TANK AND CONCENTRATOR. PATIENT SON WAS CALLED AND LEFT 
MESSAGE OF THE DELIVERY AND WOULD HE LIKE TO COME TO PICK MOTHER UP TO TAKE HOME. [email or web address]

## 2022-09-09 NOTE — NUR
DC PLANNING:

CALLED [place] [phone] SPOKE WITH LUZ MARIA STATED STILL WORKING ON IT BUT ONCE THEY 
FINISHER THEY WILL DELIVER THE HOME O2 TO THE HOSPITAL BETWEEN 6-10 PM. CM TO FOLLOW

## 2022-09-09 NOTE — NUR
PATIENT WAS CLEANED AND REPOSITIONED. PATIENT TOLERATED IT WELL. NO FACIAL GRIMACING TO 
INDICATE PAIN/DISCOMFORT. NO NOTED S/S OF RESPIRATORY DISTRESS. BED AT THE LOWEST LEVEL IN 
THE SEMI PEREZ POSITION. NO S/S OF COVID 19 AND NO SYMPTOM OF FLU. NURSING WILL FREQUENT 
THIS ROOM FOR ANTICIPATED ASSISTANCE. [email or web address]

## 2022-09-09 NOTE — NUR
***** PHYSICAL THERAPY CO-SIGN *****

The Physical Therapy Progress Notes documented by Physical Therapy Assistant have been 
reviewed.



Reviewed/Co-Signed by: Sondra Mayes

Documentation Done by: EMERSON BYERS PTA

-------------------------------------------------------------------------------

Addendum: 09/09/22 at 1229 by Sondra Mayes PT

-------------------------------------------------------------------------------

Amended: Links added.

## 2022-09-09 NOTE — NUR
PT RECEIVED FROM [facility] RT ON SUPPLEMENTAL OXYGEN 2 LITERS NASAL CANNULA. PT IS LAYING 
COMFORTABLY IN SEMIFOWLERS POSITION IN NO RESPIRATORY DISTRESS, WILL CONTINUE TO MONITOR PT.

## 2022-09-10 VITALS — SYSTOLIC BLOOD PRESSURE: 132 MMHG | DIASTOLIC BLOOD PRESSURE: 78 MMHG

## 2022-09-10 VITALS — SYSTOLIC BLOOD PRESSURE: 132 MMHG | DIASTOLIC BLOOD PRESSURE: 71 MMHG

## 2022-09-10 RX ADMIN — Medication SCH MG: at 08:47

## 2022-09-10 RX ADMIN — DEXTROSE SCH MLS/HR: 50 INJECTION, SOLUTION INTRAVENOUS at 08:42

## 2022-09-10 RX ADMIN — RIVAROXABAN SCH MG: 15 TABLET, FILM COATED ORAL at 08:44

## 2022-09-10 RX ADMIN — OXYBUTYNIN CHLORIDE SCH MG: 5 TABLET ORAL at 08:41

## 2022-09-10 RX ADMIN — OXYCODONE HYDROCHLORIDE AND ACETAMINOPHEN SCH MG: 500 TABLET ORAL at 08:44

## 2022-09-10 RX ADMIN — DEXAMETHASONE SODIUM PHOSPHATE SCH MG: 4 INJECTION, SOLUTION INTRAMUSCULAR; INTRAVENOUS at 08:48

## 2022-09-10 RX ADMIN — OSELTAMIVIR PHOSPHATE SCH MG: 75 CAPSULE ORAL at 08:42

## 2022-09-10 RX ADMIN — Medication SCH MG: at 08:42

## 2022-09-10 RX ADMIN — AMIODARONE HYDROCHLORIDE SCH MG: 200 TABLET ORAL at 08:46

## 2022-09-10 NOTE — NUR
PATIENT AWAKE IN BED NON VERBAL. KEPT CLEAN AND DRY. WILL ENDORSE TO AM SHIFT TO FOLLOW [facility] 
WITH SOCIAL SERVICE TO GETTING PATIENT TRANSPORTATION HOME. OXYGEN AND CONCENTRATOR AGE AT 
BEDSIDE. [email or web address]

## 2022-09-10 NOTE — NUR
PATIENT IN BED ASLEEP. KEPT CLEAN AND DRY. CHEST RISING AND FALLING WITHOUT DISTRESS. NO 
NOTED S/S OF COVID 19 NOR FLU AT THIS TIME. PATIENT IS REPOSITIONED EVERY TWO HOURS FOR 
PREVENTION OF PRESSURE ULCER. BED AT THE LOWEST LEVEL. SIDE RAILS UP X 3 FOR SAFETY AND 
COMFORT. CALL LIGHT WITHIN REACH FOR MENTAL STIMULATION TO THE TV. NO NOTED S/S OF 
PAIN/DISCOMFORT. NURSING WILL FREQUENT THIS ROOM FOR ASSISTANCE. [id number]

## 2022-09-10 NOTE — NUR
ENDORSED PATIENT TO RADHIKA MARTINEZ FOR CONTINUITY OF CARE. PATIENT WAS STABLE DURING CHANGE OF 
SHIFT. ENDORSED DISCHARGE PLANNING ORDERS FOR YESTERDAY BUT SON WANT TO HAVE TRANSPORTATION. 
OXYGEN ANS CONCENTRATOR IS AT BED SIDE. [email or web address]

## 2022-09-10 NOTE — NUR
DISCHARGE PT TO HOME ACCOMPANIED BY SON, IV LINE REMOVED AND PT IS STABLE AT THIS TIME, PT'S 
SON WAS GIVEN TEACHINGS AND INSTRUCTIONS AND VERBALIZED UNDERSTANDING.

## 2024-05-06 NOTE — NUR
WAS ENDORSED TO THIS PATIENT BY JUSTO MARTINEZ. PATIENT WAS STABLE DURING SHIFT REPORT. PATIENT 
DOES NOT SPEAK ENGLISH AND  ID [id number] TRANSLATED THE CARE PROVIDED FOR PATIENT. 
PATIENT WAS IN BED ALERT AND MOVED HER EYES WHEN HER NAME WAS CALLED. NO NOTED S/S OF 
PAIN/DISCOMFORT. NO NOTED RESPIRATORY DISTRESS. NO NOTED S/S OF COVID 19 WERE PRESENT AT 
THIS TIME. VITAL SIGN SHOW NOT ELEVATED TEMPERATURE. BED WAS AT ITS LOWEST LEVEL. SIDE RAILS 
X 4 FOR SAFETY. CALL LIGHT WITHIN REACH FOR MENTAL STIMULATION. NURSING WILL FREQUENT THIS 
ROOM FOR ANTICIPATED NEEDS. [email or web address] Unanticipated physiological fall